# Patient Record
Sex: FEMALE | Race: BLACK OR AFRICAN AMERICAN
[De-identification: names, ages, dates, MRNs, and addresses within clinical notes are randomized per-mention and may not be internally consistent; named-entity substitution may affect disease eponyms.]

---

## 2023-07-06 ENCOUNTER — APPOINTMENT (OUTPATIENT)
Dept: OBGYN | Facility: CLINIC | Age: 63
End: 2023-07-06
Payer: MEDICARE

## 2023-07-06 VITALS
SYSTOLIC BLOOD PRESSURE: 150 MMHG | HEART RATE: 79 BPM | BODY MASS INDEX: 38.62 KG/M2 | OXYGEN SATURATION: 99 % | TEMPERATURE: 98.6 F | HEIGHT: 63 IN | DIASTOLIC BLOOD PRESSURE: 94 MMHG | WEIGHT: 218 LBS

## 2023-07-06 DIAGNOSIS — Z01.419 ENCOUNTER FOR GYNECOLOGICAL EXAMINATION (GENERAL) (ROUTINE) W/OUT ABNORMAL FINDINGS: ICD-10-CM

## 2023-07-06 DIAGNOSIS — N95.0 POSTMENOPAUSAL BLEEDING: ICD-10-CM

## 2023-07-06 PROBLEM — Z00.00 ENCOUNTER FOR PREVENTIVE HEALTH EXAMINATION: Status: ACTIVE | Noted: 2023-07-06

## 2023-07-06 LAB
BILIRUB UR QL STRIP: NEGATIVE
CLARITY UR: NORMAL
COLLECTION METHOD: NORMAL
GLUCOSE UR-MCNC: NORMAL
HCG UR QL: 0.2 EU/DL
HGB UR QL STRIP.AUTO: NORMAL
KETONES UR-MCNC: NEGATIVE
LEUKOCYTE ESTERASE UR QL STRIP: NEGATIVE
NITRITE UR QL STRIP: NEGATIVE
PH UR STRIP: 5.5
PROT UR STRIP-MCNC: NEGATIVE
SP GR UR STRIP: 1.02

## 2023-07-06 PROCEDURE — 81003 URINALYSIS AUTO W/O SCOPE: CPT | Mod: QW

## 2023-07-06 PROCEDURE — 99386 PREV VISIT NEW AGE 40-64: CPT

## 2023-07-06 NOTE — PHYSICAL EXAM
[Chaperone Present] : A chaperone was present in the examining room during all aspects of the physical examination [Appropriately responsive] : appropriately responsive [Alert] : alert [No Acute Distress] : no acute distress [Oriented x3] : oriented x3 [FreeTextEntry7] : obese  [Examination Of The Breasts] : a normal appearance [Breast Hypertrophy Bilateral] : hypertrophy [No Masses] : no breast masses were palpable [Labia Majora] : normal [Labia Minora] : normal [Scant] : There was scant vaginal bleeding [Normal] : normal [Uterine Adnexae] : normal

## 2023-07-06 NOTE — HISTORY OF PRESENT ILLNESS
[FreeTextEntry1] : NEW PATIENT PRESENT C/O POST MENOPAUSAL BLEED [TextBox_4] : pt presents for annual and c/o post menopausal bleeding/spotting \par pt with hx of DM/HTN \par HX- cholecystectomy then a partial intestine removal

## 2023-07-06 NOTE — PROCEDURE
[Cervical Pap Smear] : cervical Pap smear [Liquid Base] : liquid base [Tolerated Well] : the patient tolerated the procedure well [No Complications] : there were no complications [Endometrial Biopsy] : Endometrial biopsy [Post-Menop. Bleeding] : post-menopausal bleeding [Risks] : risks [Benefits] : benefits [Alternatives] : alternatives [de-identified] : HPV

## 2023-07-06 NOTE — PLAN
[FreeTextEntry1] : annual with pap/hpv \par EMB done \par TVS\par bone density \par metrogel vaginal cream 1 pv qhs x 5 \par diet/exercise\par f/u with PCP as needed \par pt to f/u results / rto 2- 3weeks/prn

## 2023-07-07 RX ORDER — METRONIDAZOLE 7.5 MG/G
0.75 GEL VAGINAL
Qty: 1 | Refills: 0 | Status: ACTIVE | COMMUNITY
Start: 2023-07-07 | End: 1900-01-01

## 2023-07-10 LAB
CYTOLOGY CVX/VAG DOC THIN PREP: NORMAL
HPV HIGH+LOW RISK DNA PNL CVX: NOT DETECTED

## 2023-07-11 LAB — CORE LAB BIOPSY: NORMAL

## 2023-08-01 ENCOUNTER — APPOINTMENT (OUTPATIENT)
Dept: CV DIAGNOSITCS | Facility: HOSPITAL | Age: 63
End: 2023-08-01

## 2023-08-15 ENCOUNTER — OUTPATIENT (OUTPATIENT)
Dept: OUTPATIENT SERVICES | Facility: HOSPITAL | Age: 63
LOS: 1 days | End: 2023-08-15
Payer: MEDICARE

## 2023-08-15 ENCOUNTER — RESULT REVIEW (OUTPATIENT)
Age: 63
End: 2023-08-15

## 2023-08-15 DIAGNOSIS — Z00.8 ENCOUNTER FOR OTHER GENERAL EXAMINATION: ICD-10-CM

## 2023-08-15 DIAGNOSIS — R07.9 CHEST PAIN, UNSPECIFIED: ICD-10-CM

## 2023-08-15 PROCEDURE — 93018 CV STRESS TEST I&R ONLY: CPT

## 2023-08-15 PROCEDURE — A9500: CPT

## 2023-08-15 PROCEDURE — 78452 HT MUSCLE IMAGE SPECT MULT: CPT | Mod: 26

## 2023-08-15 PROCEDURE — 78452 HT MUSCLE IMAGE SPECT MULT: CPT

## 2023-08-16 DIAGNOSIS — R07.9 CHEST PAIN, UNSPECIFIED: ICD-10-CM

## 2023-10-17 ENCOUNTER — OUTPATIENT (OUTPATIENT)
Dept: OUTPATIENT SERVICES | Facility: HOSPITAL | Age: 63
LOS: 1 days | End: 2023-10-17
Payer: MEDICARE

## 2023-10-17 VITALS
DIASTOLIC BLOOD PRESSURE: 63 MMHG | RESPIRATION RATE: 18 BRPM | HEIGHT: 63 IN | WEIGHT: 214.07 LBS | OXYGEN SATURATION: 97 % | HEART RATE: 98 BPM | TEMPERATURE: 98 F | SYSTOLIC BLOOD PRESSURE: 127 MMHG

## 2023-10-17 DIAGNOSIS — Z90.49 ACQUIRED ABSENCE OF OTHER SPECIFIED PARTS OF DIGESTIVE TRACT: Chronic | ICD-10-CM

## 2023-10-17 DIAGNOSIS — Z01.818 ENCOUNTER FOR OTHER PREPROCEDURAL EXAMINATION: ICD-10-CM

## 2023-10-17 DIAGNOSIS — N85.00 ENDOMETRIAL HYPERPLASIA, UNSPECIFIED: ICD-10-CM

## 2023-10-17 LAB
A1C WITH ESTIMATED AVERAGE GLUCOSE RESULT: 7.4 % — HIGH (ref 4–5.6)
A1C WITH ESTIMATED AVERAGE GLUCOSE RESULT: 7.4 % — HIGH (ref 4–5.6)
ALBUMIN SERPL ELPH-MCNC: 4.5 G/DL — SIGNIFICANT CHANGE UP (ref 3.5–5.2)
ALBUMIN SERPL ELPH-MCNC: 4.5 G/DL — SIGNIFICANT CHANGE UP (ref 3.5–5.2)
ALP SERPL-CCNC: 113 U/L — SIGNIFICANT CHANGE UP (ref 30–115)
ALP SERPL-CCNC: 113 U/L — SIGNIFICANT CHANGE UP (ref 30–115)
ALT FLD-CCNC: 13 U/L — SIGNIFICANT CHANGE UP (ref 0–41)
ALT FLD-CCNC: 13 U/L — SIGNIFICANT CHANGE UP (ref 0–41)
ANION GAP SERPL CALC-SCNC: 16 MMOL/L — HIGH (ref 7–14)
ANION GAP SERPL CALC-SCNC: 16 MMOL/L — HIGH (ref 7–14)
APTT BLD: 31.6 SEC — SIGNIFICANT CHANGE UP (ref 27–39.2)
APTT BLD: 31.6 SEC — SIGNIFICANT CHANGE UP (ref 27–39.2)
AST SERPL-CCNC: 12 U/L — SIGNIFICANT CHANGE UP (ref 0–41)
AST SERPL-CCNC: 12 U/L — SIGNIFICANT CHANGE UP (ref 0–41)
BASOPHILS # BLD AUTO: 0.05 K/UL — SIGNIFICANT CHANGE UP (ref 0–0.2)
BASOPHILS # BLD AUTO: 0.05 K/UL — SIGNIFICANT CHANGE UP (ref 0–0.2)
BASOPHILS NFR BLD AUTO: 0.4 % — SIGNIFICANT CHANGE UP (ref 0–1)
BASOPHILS NFR BLD AUTO: 0.4 % — SIGNIFICANT CHANGE UP (ref 0–1)
BILIRUB SERPL-MCNC: 0.3 MG/DL — SIGNIFICANT CHANGE UP (ref 0.2–1.2)
BILIRUB SERPL-MCNC: 0.3 MG/DL — SIGNIFICANT CHANGE UP (ref 0.2–1.2)
BUN SERPL-MCNC: 14 MG/DL — SIGNIFICANT CHANGE UP (ref 10–20)
BUN SERPL-MCNC: 14 MG/DL — SIGNIFICANT CHANGE UP (ref 10–20)
CALCIUM SERPL-MCNC: 9.4 MG/DL — SIGNIFICANT CHANGE UP (ref 8.4–10.5)
CALCIUM SERPL-MCNC: 9.4 MG/DL — SIGNIFICANT CHANGE UP (ref 8.4–10.5)
CHLORIDE SERPL-SCNC: 100 MMOL/L — SIGNIFICANT CHANGE UP (ref 98–110)
CHLORIDE SERPL-SCNC: 100 MMOL/L — SIGNIFICANT CHANGE UP (ref 98–110)
CO2 SERPL-SCNC: 23 MMOL/L — SIGNIFICANT CHANGE UP (ref 17–32)
CO2 SERPL-SCNC: 23 MMOL/L — SIGNIFICANT CHANGE UP (ref 17–32)
CREAT SERPL-MCNC: 0.7 MG/DL — SIGNIFICANT CHANGE UP (ref 0.7–1.5)
CREAT SERPL-MCNC: 0.7 MG/DL — SIGNIFICANT CHANGE UP (ref 0.7–1.5)
EGFR: 97 ML/MIN/1.73M2 — SIGNIFICANT CHANGE UP
EGFR: 97 ML/MIN/1.73M2 — SIGNIFICANT CHANGE UP
EOSINOPHIL # BLD AUTO: 0.23 K/UL — SIGNIFICANT CHANGE UP (ref 0–0.7)
EOSINOPHIL # BLD AUTO: 0.23 K/UL — SIGNIFICANT CHANGE UP (ref 0–0.7)
EOSINOPHIL NFR BLD AUTO: 1.9 % — SIGNIFICANT CHANGE UP (ref 0–8)
EOSINOPHIL NFR BLD AUTO: 1.9 % — SIGNIFICANT CHANGE UP (ref 0–8)
ESTIMATED AVERAGE GLUCOSE: 166 MG/DL — HIGH (ref 68–114)
ESTIMATED AVERAGE GLUCOSE: 166 MG/DL — HIGH (ref 68–114)
GLUCOSE SERPL-MCNC: 219 MG/DL — HIGH (ref 70–99)
GLUCOSE SERPL-MCNC: 219 MG/DL — HIGH (ref 70–99)
HCT VFR BLD CALC: 43.1 % — SIGNIFICANT CHANGE UP (ref 37–47)
HCT VFR BLD CALC: 43.1 % — SIGNIFICANT CHANGE UP (ref 37–47)
HGB BLD-MCNC: 13.3 G/DL — SIGNIFICANT CHANGE UP (ref 12–16)
HGB BLD-MCNC: 13.3 G/DL — SIGNIFICANT CHANGE UP (ref 12–16)
IMM GRANULOCYTES NFR BLD AUTO: 0.9 % — HIGH (ref 0.1–0.3)
IMM GRANULOCYTES NFR BLD AUTO: 0.9 % — HIGH (ref 0.1–0.3)
INR BLD: 0.97 RATIO — SIGNIFICANT CHANGE UP (ref 0.65–1.3)
INR BLD: 0.97 RATIO — SIGNIFICANT CHANGE UP (ref 0.65–1.3)
LYMPHOCYTES # BLD AUTO: 26.6 % — SIGNIFICANT CHANGE UP (ref 20.5–51.1)
LYMPHOCYTES # BLD AUTO: 26.6 % — SIGNIFICANT CHANGE UP (ref 20.5–51.1)
LYMPHOCYTES # BLD AUTO: 3.26 K/UL — SIGNIFICANT CHANGE UP (ref 1.2–3.4)
LYMPHOCYTES # BLD AUTO: 3.26 K/UL — SIGNIFICANT CHANGE UP (ref 1.2–3.4)
MCHC RBC-ENTMCNC: 25.9 PG — LOW (ref 27–31)
MCHC RBC-ENTMCNC: 25.9 PG — LOW (ref 27–31)
MCHC RBC-ENTMCNC: 30.9 G/DL — LOW (ref 32–37)
MCHC RBC-ENTMCNC: 30.9 G/DL — LOW (ref 32–37)
MCV RBC AUTO: 83.9 FL — SIGNIFICANT CHANGE UP (ref 81–99)
MCV RBC AUTO: 83.9 FL — SIGNIFICANT CHANGE UP (ref 81–99)
MONOCYTES # BLD AUTO: 0.58 K/UL — SIGNIFICANT CHANGE UP (ref 0.1–0.6)
MONOCYTES # BLD AUTO: 0.58 K/UL — SIGNIFICANT CHANGE UP (ref 0.1–0.6)
MONOCYTES NFR BLD AUTO: 4.7 % — SIGNIFICANT CHANGE UP (ref 1.7–9.3)
MONOCYTES NFR BLD AUTO: 4.7 % — SIGNIFICANT CHANGE UP (ref 1.7–9.3)
NEUTROPHILS # BLD AUTO: 8.03 K/UL — HIGH (ref 1.4–6.5)
NEUTROPHILS # BLD AUTO: 8.03 K/UL — HIGH (ref 1.4–6.5)
NEUTROPHILS NFR BLD AUTO: 65.5 % — SIGNIFICANT CHANGE UP (ref 42.2–75.2)
NEUTROPHILS NFR BLD AUTO: 65.5 % — SIGNIFICANT CHANGE UP (ref 42.2–75.2)
NRBC # BLD: 0 /100 WBCS — SIGNIFICANT CHANGE UP (ref 0–0)
NRBC # BLD: 0 /100 WBCS — SIGNIFICANT CHANGE UP (ref 0–0)
PLATELET # BLD AUTO: 259 K/UL — SIGNIFICANT CHANGE UP (ref 130–400)
PLATELET # BLD AUTO: 259 K/UL — SIGNIFICANT CHANGE UP (ref 130–400)
PMV BLD: 12.1 FL — HIGH (ref 7.4–10.4)
PMV BLD: 12.1 FL — HIGH (ref 7.4–10.4)
POTASSIUM SERPL-MCNC: 4.6 MMOL/L — SIGNIFICANT CHANGE UP (ref 3.5–5)
POTASSIUM SERPL-MCNC: 4.6 MMOL/L — SIGNIFICANT CHANGE UP (ref 3.5–5)
POTASSIUM SERPL-SCNC: 4.6 MMOL/L — SIGNIFICANT CHANGE UP (ref 3.5–5)
POTASSIUM SERPL-SCNC: 4.6 MMOL/L — SIGNIFICANT CHANGE UP (ref 3.5–5)
PROT SERPL-MCNC: 8.1 G/DL — HIGH (ref 6–8)
PROT SERPL-MCNC: 8.1 G/DL — HIGH (ref 6–8)
PROTHROM AB SERPL-ACNC: 11 SEC — SIGNIFICANT CHANGE UP (ref 9.95–12.87)
PROTHROM AB SERPL-ACNC: 11 SEC — SIGNIFICANT CHANGE UP (ref 9.95–12.87)
RBC # BLD: 5.14 M/UL — SIGNIFICANT CHANGE UP (ref 4.2–5.4)
RBC # BLD: 5.14 M/UL — SIGNIFICANT CHANGE UP (ref 4.2–5.4)
RBC # FLD: 16.3 % — HIGH (ref 11.5–14.5)
RBC # FLD: 16.3 % — HIGH (ref 11.5–14.5)
SODIUM SERPL-SCNC: 139 MMOL/L — SIGNIFICANT CHANGE UP (ref 135–146)
SODIUM SERPL-SCNC: 139 MMOL/L — SIGNIFICANT CHANGE UP (ref 135–146)
WBC # BLD: 12.26 K/UL — HIGH (ref 4.8–10.8)
WBC # BLD: 12.26 K/UL — HIGH (ref 4.8–10.8)
WBC # FLD AUTO: 12.26 K/UL — HIGH (ref 4.8–10.8)
WBC # FLD AUTO: 12.26 K/UL — HIGH (ref 4.8–10.8)

## 2023-10-17 PROCEDURE — 85730 THROMBOPLASTIN TIME PARTIAL: CPT

## 2023-10-17 PROCEDURE — 93005 ELECTROCARDIOGRAM TRACING: CPT

## 2023-10-17 PROCEDURE — 80053 COMPREHEN METABOLIC PANEL: CPT

## 2023-10-17 PROCEDURE — 83036 HEMOGLOBIN GLYCOSYLATED A1C: CPT

## 2023-10-17 PROCEDURE — 36415 COLL VENOUS BLD VENIPUNCTURE: CPT

## 2023-10-17 PROCEDURE — 99214 OFFICE O/P EST MOD 30 MIN: CPT | Mod: 25

## 2023-10-17 PROCEDURE — 85610 PROTHROMBIN TIME: CPT

## 2023-10-17 PROCEDURE — 85025 COMPLETE CBC W/AUTO DIFF WBC: CPT

## 2023-10-17 PROCEDURE — 93010 ELECTROCARDIOGRAM REPORT: CPT

## 2023-10-17 NOTE — H&P PST ADULT - HISTORY OF PRESENT ILLNESS
pt with post menopausal bleeding  now for planned procedure     PATIENT CURRENTLY DENIES CHEST PAIN  SHORTNESS OF BREATH  PALPITATIONS,  DYSURIA, OR UPPER RESPIRATORY INFECTION IN PAST 2 WEEKS  denies travel outside the [place] in the past 30 days  Patient denies any signs or symptoms of COVID 19 and denies contact with known positive individuals.  They have an appointment for repeat COVID testing pre-procedure and acknowledge its time and place.  They were instructed to quarantine pre-procedure, practice exposure control measures, continue to self-monitor and report any concerns to their proceduralist.  pt advised self quarantine till day of procedure    Anesthesia Alert  Difficult Airway class iv  NO--History of neck surger y or radiation  NO--Limited ROM of neck  NO--History of Malignant hyperthermia  NO--No personal or family history of Pseudocholinesterase deficiency.  NO--Prior Anesthesia Complication  NO--Latex Allergy  + Loose teeth- sl loose bottom font   NO--History of Rheumatoid Arthritis  NO--Bleeding risk  NO--GAGANDEEP  NO--Other_____    PT DENIES ANY RASHES, ABRASION, OR OPEN WOUNDS OR CUTS    AS PER THE PT, THIS IS HIS/HER COMPLETE MEDICAL AND SURGICAL HX, INCLUDING MEDICATIONS PRESCRIBED AND OVER THE COUNTER    Patient verbalized understanding of instructions and was given the opportunity to ask questions and have them answered.    pt denies any suicidal ideation or thoughts, pt states not a threat to self or others     Endometrial hyperplasia    Encounter for other preprocedural examination    [id number]    SysAdmin_VstLnk  Revised Cardiac Risk Index for Pre-Operative Risk from [employer]  on 10/17/2023    RESULT SUMMARY:  1 points  Class II Risk    6.0 %  30-day risk of death, MI, or cardiac arrest    From Ducescobar 2017, based on pooled data from 5 high quality external validations (4 prospective). These numbers are higher than those often quoted from the now-outdated original study (Roman 1999). See Evidence for details.      INPUTS:  Elevated-risk surgery —> 0 = No  History of ischemic heart disease —> 0 = No  History of congestive heart failure —> 0 = No  History of cerebrovascular disease —> 0 = No  Pre-operative treatment with insulin —> 1 = Yes  Pre-operative creatinine >2 mg/dL / 176.8 µmol/L —> 0 = No    Duke Activity Status Index (DASI) from [employer]  on 10/17/2023    RESULT SUMMARY:  25.95 points  The higher the score (maximum 58.2), the higher the functional status.    5.93 METs    INPUTS:  Take care of self —> 2.75 = Yes  Walk indoors —> 1.75 = Yes  Walk 1&ndash;2 blocks on level ground —> 2.75 = Yes  Climb a flight of stairs or walk up a hill —> 0 = No  Run a short distance —> 0 = No  Do light work around the house —> 2.7 = Yes  Do moderate work around the house —> 3.5 = Yes  Do heavy work around the house —> 8 = Yes  Do yardwork —> 4.5 = Yes  Have sexual relations —> 0 = No  Participate in moderate recreational activities —> 0 = No  Participate in strenuous sports —> 0 = No

## 2023-10-17 NOTE — H&P PST ADULT - REASON FOR ADMISSION
Patient is a  63 year old  female presenting to PAST in preparation for  DILATION AND CURETTAGE HYSTEROSCOPY MYOSURE on 10/26/23  under general anesthesia by bubba Corrigan

## 2023-10-17 NOTE — H&P PST ADULT - NSANTHOSAYNRD_GEN_A_CORE
No. GAGANDEEP screening performed.  STOP BANG Legend: 0-2 = LOW Risk; 3-4 = INTERMEDIATE Risk; 5-8 = HIGH Risk

## 2023-10-18 DIAGNOSIS — N85.00 ENDOMETRIAL HYPERPLASIA, UNSPECIFIED: ICD-10-CM

## 2023-10-18 DIAGNOSIS — Z01.818 ENCOUNTER FOR OTHER PREPROCEDURAL EXAMINATION: ICD-10-CM

## 2023-10-25 NOTE — ASU PATIENT PROFILE, ADULT - FALL HARM RISK - ATTEMPT OOB
[de-identified] : \par C/O: VERTIGO/ CLOGGED EARS\par -TYMPS: TYPE C AD, TYPE Ad AS (ETF ABNORMAL AU)\par -AD: MILD TO SEVERE MIXED -8000 HZ\par -AS: HEARING - 1000 HZ, MILD TO SEVERE SNHL THROUGH 8000 HZ\par RECS: 1) ENT F/U 2)RE-EVAL IN CONJUNCTION WITH MEDICAL MANAGEMENT 3)FURTHER TESTING AS PER MD 3)FURTHER RECS PENDING (I.E. AMPLIFICATION) No

## 2023-10-25 NOTE — ASU PATIENT PROFILE, ADULT - FALL HARM RISK - UNIVERSAL INTERVENTIONS
Bed in lowest position, wheels locked, appropriate side rails in place/Call bell, personal items and telephone in reach/Instruct patient to call for assistance before getting out of bed or chair/Non-slip footwear when patient is out of bed/[place] to call system/Physically safe environment - no spills, clutter or unnecessary equipment/Purposeful Proactive Rounding/Room/bathroom lighting operational, light cord in reach

## 2023-10-26 ENCOUNTER — TRANSCRIPTION ENCOUNTER (OUTPATIENT)
Age: 63
End: 2023-10-26

## 2023-10-26 ENCOUNTER — RESULT REVIEW (OUTPATIENT)
Age: 63
End: 2023-10-26

## 2023-10-26 ENCOUNTER — OUTPATIENT (OUTPATIENT)
Dept: OUTPATIENT SERVICES | Facility: HOSPITAL | Age: 63
LOS: 1 days | Discharge: HOME | End: 2023-10-26
Payer: MEDICARE

## 2023-10-26 VITALS
HEIGHT: 63 IN | RESPIRATION RATE: 18 BRPM | HEART RATE: 90 BPM | OXYGEN SATURATION: 98 % | TEMPERATURE: 99 F | SYSTOLIC BLOOD PRESSURE: 133 MMHG | DIASTOLIC BLOOD PRESSURE: 63 MMHG | WEIGHT: 214.07 LBS

## 2023-10-26 VITALS
HEART RATE: 92 BPM | OXYGEN SATURATION: 98 % | RESPIRATION RATE: 20 BRPM | DIASTOLIC BLOOD PRESSURE: 56 MMHG | SYSTOLIC BLOOD PRESSURE: 133 MMHG

## 2023-10-26 DIAGNOSIS — Z90.49 ACQUIRED ABSENCE OF OTHER SPECIFIED PARTS OF DIGESTIVE TRACT: Chronic | ICD-10-CM

## 2023-10-26 DIAGNOSIS — N85.00 ENDOMETRIAL HYPERPLASIA, UNSPECIFIED: ICD-10-CM

## 2023-10-26 LAB
GLUCOSE BLDC GLUCOMTR-MCNC: 188 MG/DL — HIGH (ref 70–99)
GLUCOSE BLDC GLUCOMTR-MCNC: 188 MG/DL — HIGH (ref 70–99)

## 2023-10-26 PROCEDURE — 58558 HYSTEROSCOPY BIOPSY: CPT

## 2023-10-26 PROCEDURE — 82962 GLUCOSE BLOOD TEST: CPT

## 2023-10-26 PROCEDURE — 88305 TISSUE EXAM BY PATHOLOGIST: CPT | Mod: 26

## 2023-10-26 PROCEDURE — 88305 TISSUE EXAM BY PATHOLOGIST: CPT

## 2023-10-26 RX ORDER — OXYCODONE HYDROCHLORIDE 5 MG/1
5 TABLET ORAL ONCE
Refills: 0 | Status: DISCONTINUED | OUTPATIENT
Start: 2023-10-26 | End: 2023-10-26

## 2023-10-26 RX ORDER — SODIUM CHLORIDE 9 MG/ML
1000 INJECTION, SOLUTION INTRAVENOUS
Refills: 0 | Status: DISCONTINUED | OUTPATIENT
Start: 2023-10-26 | End: 2023-10-26

## 2023-10-26 RX ORDER — HYDROMORPHONE HYDROCHLORIDE 2 MG/ML
1 INJECTION INTRAMUSCULAR; INTRAVENOUS; SUBCUTANEOUS
Refills: 0 | Status: DISCONTINUED | OUTPATIENT
Start: 2023-10-26 | End: 2023-10-26

## 2023-10-26 RX ORDER — ONDANSETRON 8 MG/1
4 TABLET, FILM COATED ORAL ONCE
Refills: 0 | Status: DISCONTINUED | OUTPATIENT
Start: 2023-10-26 | End: 2023-10-26

## 2023-10-26 RX ORDER — HYDROMORPHONE HYDROCHLORIDE 2 MG/ML
0.5 INJECTION INTRAMUSCULAR; INTRAVENOUS; SUBCUTANEOUS
Refills: 0 | Status: DISCONTINUED | OUTPATIENT
Start: 2023-10-26 | End: 2023-10-26

## 2023-10-26 NOTE — ASU DISCHARGE PLAN (ADULT/PEDIATRIC) - CARE PROVIDER_API CALL
Montez Randolph  Obstetrics and Gynecology  [address] [place] [id number]  Phone: [phone]  Fax: [phone]  Follow Up Time:

## 2023-10-26 NOTE — ASU PREOP CHECKLIST - DENTURES
Patient had a cataract surgery yesterday. She did have pain to the area. She had a post op appointment and put drops in her eyes. She had a pressure of 45 then dropped to 35. She was given drops. She is suppose to take the drops three times a day. She only put two doses in her eye.   
no

## 2023-10-26 NOTE — CHART NOTE - NSCHARTNOTEFT_GEN_A_CORE
PACU ANESTHESIA ADMISSION NOTE      Procedure: D&C Hysteroscopy, ECC  Post op diagnosis:      ____  Intubated  TV:______       Rate: ______      FiO2: ______    _x___  Patent Airway    _x___  Full return of protective reflexes    _x___  Full recovery from anesthesia / back to baseline status    Vitals:  T(C): 37 (10-26-23 @ 07:14), Max: 37 (10-26-23 @ 06:50)  HR: 90 (10-26-23 @ 07:14) (90 - 90)  BP: 133/63 (10-26-23 @ 07:14) (133/63 - 133/63)  RR: 18 (10-26-23 @ 07:14) (18 - 18)  SpO2: 98% (10-26-23 @ 07:14) (98% - 98%)    Mental Status:  _x___ Awake   _____ Alert   _____ Drowsy   _____ Sedated    Nausea/Vomiting:  _x___  NO       ______Yes,   See Post - Op Orders         Pain Scale (0-10):  __0___    Treatment: _x___ None    ____ See Post - Op/PCA Orders    Post - Operative Fluids:   __x__ Oral   ____ See Post - Op Orders    Plan: Discharge:   _x___Home       _____Floor     _____Critical Care    _____  Other:_________________    Comments:  No anesthesia issues or complications noted.  Discharge when criteria met. PACU ANESTHESIA ADMISSION NOTE      Procedure: D&C Hysteroscopy, ECC  Post op diagnosis:  postmenopausal bleeding      ____  Intubated  TV:______       Rate: ______      FiO2: ______    _x___  Patent Airway    _x___  Full return of protective reflexes    _x___  Full recovery from anesthesia / back to baseline status    Vitals:  T(C): 37 (10-26-23 @ 07:14), Max: 37 (10-26-23 @ 06:50)  HR: 90 (10-26-23 @ 07:14) (90 - 90)  BP: 133/63 (10-26-23 @ 07:14) (133/63 - 133/63)  RR: 18 (10-26-23 @ 07:14) (18 - 18)  SpO2: 98% (10-26-23 @ 07:14) (98% - 98%)    Mental Status:  _x___ Awake   _____ Alert   _____ Drowsy   _____ Sedated    Nausea/Vomiting:  _x___  NO       ______Yes,   See Post - Op Orders         Pain Scale (0-10):  __0___    Treatment: _x___ None    ____ See Post - Op/PCA Orders    Post - Operative Fluids:   __x__ Oral   ____ See Post - Op Orders    Plan: Discharge:   _x___Home       _____Floor     _____Critical Care    _____  Other:_________________    Comments:  No anesthesia issues or complications noted.  All teeth intact  Discharge when criteria met.

## 2023-10-26 NOTE — ASU DISCHARGE PLAN (ADULT/PEDIATRIC) - NS MD DC FALL RISK RISK
For information on Fall & Injury Prevention, visit: [email or web address] OR  [email or web address] OR  https://www.cdc.gov/steadi/patient.html

## 2023-10-26 NOTE — BRIEF OPERATIVE NOTE - OPERATION/FINDINGS
Normal external genitalia. Mobile, anteverted uterus, sounding to 9cm. Multiparous cervix. polypoid like tissue in the lower uterine segment on the posterior wall. Bilateral tubal ostia visualized.

## 2023-10-27 PROBLEM — F32.A DEPRESSION, UNSPECIFIED: Chronic | Status: ACTIVE | Noted: 2023-10-17

## 2023-10-27 PROBLEM — E11.9 TYPE 2 DIABETES MELLITUS WITHOUT COMPLICATIONS: Chronic | Status: ACTIVE | Noted: 2023-10-17

## 2023-10-27 PROBLEM — F41.9 ANXIETY DISORDER, UNSPECIFIED: Chronic | Status: ACTIVE | Noted: 2023-10-17

## 2023-10-27 PROBLEM — E66.9 OBESITY, UNSPECIFIED: Chronic | Status: ACTIVE | Noted: 2023-10-17

## 2023-10-28 LAB
SURGICAL PATHOLOGY STUDY: SIGNIFICANT CHANGE UP
SURGICAL PATHOLOGY STUDY: SIGNIFICANT CHANGE UP

## 2023-10-30 DIAGNOSIS — N84.1 POLYP OF CERVIX UTERI: ICD-10-CM

## 2023-10-30 DIAGNOSIS — N87.9 DYSPLASIA OF CERVIX UTERI, UNSPECIFIED: ICD-10-CM

## 2023-10-30 DIAGNOSIS — Z79.85 LONG-TERM (CURRENT) USE OF INJECTABLE NON-INSULIN ANTIDIABETIC DRUGS: ICD-10-CM

## 2023-10-30 DIAGNOSIS — N95.0 POSTMENOPAUSAL BLEEDING: ICD-10-CM

## 2023-10-30 DIAGNOSIS — Z79.84 LONG TERM (CURRENT) USE OF ORAL HYPOGLYCEMIC DRUGS: ICD-10-CM

## 2023-10-30 DIAGNOSIS — E11.9 TYPE 2 DIABETES MELLITUS WITHOUT COMPLICATIONS: ICD-10-CM

## 2023-10-30 DIAGNOSIS — F17.210 NICOTINE DEPENDENCE, CIGARETTES, UNCOMPLICATED: ICD-10-CM

## 2023-10-30 DIAGNOSIS — N71.1 CHRONIC INFLAMMATORY DISEASE OF UTERUS: ICD-10-CM

## 2023-10-30 DIAGNOSIS — E66.9 OBESITY, UNSPECIFIED: ICD-10-CM

## 2023-10-30 DIAGNOSIS — F41.8 OTHER SPECIFIED ANXIETY DISORDERS: ICD-10-CM

## 2023-11-13 ENCOUNTER — APPOINTMENT (OUTPATIENT)
Dept: OBGYN | Facility: CLINIC | Age: 63
End: 2023-11-13

## 2024-05-28 ENCOUNTER — OUTPATIENT (OUTPATIENT)
Dept: OUTPATIENT SERVICES | Facility: HOSPITAL | Age: 64
LOS: 1 days | Discharge: HOME | End: 2024-05-28
Payer: MEDICARE

## 2024-05-28 ENCOUNTER — TRANSCRIPTION ENCOUNTER (OUTPATIENT)
Age: 64
End: 2024-05-28

## 2024-05-28 VITALS
OXYGEN SATURATION: 99 % | RESPIRATION RATE: 18 BRPM | HEART RATE: 69 BPM | HEIGHT: 62 IN | DIASTOLIC BLOOD PRESSURE: 77 MMHG | TEMPERATURE: 98 F | SYSTOLIC BLOOD PRESSURE: 140 MMHG | WEIGHT: 209 LBS

## 2024-05-28 VITALS — RESPIRATION RATE: 15 BRPM | SYSTOLIC BLOOD PRESSURE: 153 MMHG | DIASTOLIC BLOOD PRESSURE: 76 MMHG | HEART RATE: 81 BPM

## 2024-05-28 DIAGNOSIS — Z90.49 ACQUIRED ABSENCE OF OTHER SPECIFIED PARTS OF DIGESTIVE TRACT: Chronic | ICD-10-CM

## 2024-05-28 DIAGNOSIS — H25.11 AGE-RELATED NUCLEAR CATARACT, RIGHT EYE: ICD-10-CM

## 2024-05-28 LAB — GLUCOSE BLDC GLUCOMTR-MCNC: 168 MG/DL — HIGH (ref 70–99)

## 2024-05-28 PROCEDURE — 82962 GLUCOSE BLOOD TEST: CPT

## 2024-05-28 PROCEDURE — V2632: CPT

## 2024-05-28 NOTE — ASU PATIENT PROFILE, ADULT - FALL HARM RISK - HARM RISK INTERVENTIONS

## 2024-05-28 NOTE — ASU PATIENT PROFILE, ADULT - NSICDXPASTMEDICALHX_GEN_ALL_CORE_FT
PAST MEDICAL HISTORY:  Anxiety     Depression     DM (diabetes mellitus)     History of medical problems hypercholesteremia, htn, cataracts, oa, cigarette smoker    Obesity

## 2024-06-03 DIAGNOSIS — E78.5 HYPERLIPIDEMIA, UNSPECIFIED: ICD-10-CM

## 2024-06-03 DIAGNOSIS — Z79.84 LONG TERM (CURRENT) USE OF ORAL HYPOGLYCEMIC DRUGS: ICD-10-CM

## 2024-06-03 DIAGNOSIS — H25.11 AGE-RELATED NUCLEAR CATARACT, RIGHT EYE: ICD-10-CM

## 2024-06-03 DIAGNOSIS — I10 ESSENTIAL (PRIMARY) HYPERTENSION: ICD-10-CM

## 2024-06-03 DIAGNOSIS — Z79.85 LONG-TERM (CURRENT) USE OF INJECTABLE NON-INSULIN ANTIDIABETIC DRUGS: ICD-10-CM

## 2024-06-03 DIAGNOSIS — F17.210 NICOTINE DEPENDENCE, CIGARETTES, UNCOMPLICATED: ICD-10-CM

## 2024-06-03 DIAGNOSIS — E11.36 TYPE 2 DIABETES MELLITUS WITH DIABETIC CATARACT: ICD-10-CM

## 2024-06-11 ENCOUNTER — TRANSCRIPTION ENCOUNTER (OUTPATIENT)
Age: 64
End: 2024-06-11

## 2024-06-11 ENCOUNTER — OUTPATIENT (OUTPATIENT)
Dept: OUTPATIENT SERVICES | Facility: HOSPITAL | Age: 64
LOS: 1 days | Discharge: HOME | End: 2024-06-11
Payer: MEDICARE

## 2024-06-11 VITALS — SYSTOLIC BLOOD PRESSURE: 127 MMHG | DIASTOLIC BLOOD PRESSURE: 73 MMHG | HEART RATE: 60 BPM | RESPIRATION RATE: 17 BRPM

## 2024-06-11 VITALS
HEIGHT: 63 IN | WEIGHT: 199.96 LBS | HEART RATE: 80 BPM | OXYGEN SATURATION: 97 % | TEMPERATURE: 98 F | RESPIRATION RATE: 17 BRPM | DIASTOLIC BLOOD PRESSURE: 78 MMHG | SYSTOLIC BLOOD PRESSURE: 137 MMHG

## 2024-06-11 DIAGNOSIS — H26.9 UNSPECIFIED CATARACT: Chronic | ICD-10-CM

## 2024-06-11 DIAGNOSIS — Z90.49 ACQUIRED ABSENCE OF OTHER SPECIFIED PARTS OF DIGESTIVE TRACT: Chronic | ICD-10-CM

## 2024-06-11 DIAGNOSIS — H25.12 AGE-RELATED NUCLEAR CATARACT, LEFT EYE: ICD-10-CM

## 2024-06-11 LAB — GLUCOSE BLDC GLUCOMTR-MCNC: 130 MG/DL — HIGH (ref 70–99)

## 2024-06-11 PROCEDURE — V2632: CPT

## 2024-06-11 PROCEDURE — 82962 GLUCOSE BLOOD TEST: CPT

## 2024-06-11 RX ORDER — METOPROLOL TARTRATE 50 MG
0 TABLET ORAL
Refills: 0 | DISCHARGE

## 2024-06-11 RX ORDER — ATORVASTATIN CALCIUM 80 MG/1
1 TABLET, FILM COATED ORAL
Refills: 0 | DISCHARGE

## 2024-06-11 RX ORDER — CITALOPRAM 10 MG/1
1 TABLET, FILM COATED ORAL
Refills: 0 | DISCHARGE

## 2024-06-11 RX ORDER — METFORMIN HYDROCHLORIDE 850 MG/1
1 TABLET ORAL
Refills: 0 | DISCHARGE

## 2024-06-11 RX ORDER — SERTRALINE 25 MG/1
1 TABLET, FILM COATED ORAL
Refills: 0 | DISCHARGE

## 2024-06-11 RX ORDER — EMPAGLIFLOZIN 10 MG/1
1 TABLET, FILM COATED ORAL
Refills: 0 | DISCHARGE

## 2024-06-11 RX ORDER — DULAGLUTIDE 4.5 MG/.5ML
0.75 INJECTION, SOLUTION SUBCUTANEOUS
Refills: 0 | DISCHARGE

## 2024-06-11 NOTE — ASU PATIENT PROFILE, ADULT - FALL HARM RISK - HARM RISK INTERVENTIONS

## 2024-06-11 NOTE — PRE-ANESTHESIA EVALUATION ADULT - NSANTHDIETYNSD_GEN_ALL_CORE
Pt arrives with c/o right ankle pain that started after landing on it wrong while playing basketball. Pt believes he sprained his ankle. Yes

## 2024-06-11 NOTE — ASU PATIENT PROFILE, ADULT - NSICDXPASTMEDICALHX_GEN_ALL_CORE_FT
PAST MEDICAL HISTORY:  Anxiety     Cataract     Depression     DM (diabetes mellitus)     High cholesterol     History of cigarette smoking     HTN (hypertension)     OA (osteoarthritis)     Obesity

## 2024-06-13 DIAGNOSIS — Z87.891 PERSONAL HISTORY OF NICOTINE DEPENDENCE: ICD-10-CM

## 2024-06-13 DIAGNOSIS — M19.90 UNSPECIFIED OSTEOARTHRITIS, UNSPECIFIED SITE: ICD-10-CM

## 2024-06-13 DIAGNOSIS — E66.9 OBESITY, UNSPECIFIED: ICD-10-CM

## 2024-06-13 DIAGNOSIS — Z79.84 LONG TERM (CURRENT) USE OF ORAL HYPOGLYCEMIC DRUGS: ICD-10-CM

## 2024-06-13 DIAGNOSIS — Z79.85 LONG-TERM (CURRENT) USE OF INJECTABLE NON-INSULIN ANTIDIABETIC DRUGS: ICD-10-CM

## 2024-06-13 DIAGNOSIS — E11.36 TYPE 2 DIABETES MELLITUS WITH DIABETIC CATARACT: ICD-10-CM

## 2024-06-13 DIAGNOSIS — H25.12 AGE-RELATED NUCLEAR CATARACT, LEFT EYE: ICD-10-CM

## 2024-06-13 DIAGNOSIS — H25.012 CORTICAL AGE-RELATED CATARACT, LEFT EYE: ICD-10-CM

## 2024-06-13 DIAGNOSIS — F41.8 OTHER SPECIFIED ANXIETY DISORDERS: ICD-10-CM

## 2024-06-13 DIAGNOSIS — E78.00 PURE HYPERCHOLESTEROLEMIA, UNSPECIFIED: ICD-10-CM

## 2024-06-13 DIAGNOSIS — I10 ESSENTIAL (PRIMARY) HYPERTENSION: ICD-10-CM

## 2024-10-11 DIAGNOSIS — Z01.812 ENCOUNTER FOR PREPROCEDURAL LABORATORY EXAMINATION: ICD-10-CM

## 2025-02-14 PROBLEM — E78.00 PURE HYPERCHOLESTEROLEMIA, UNSPECIFIED: Chronic | Status: ACTIVE | Noted: 2024-06-11

## 2025-02-14 PROBLEM — I10 ESSENTIAL (PRIMARY) HYPERTENSION: Chronic | Status: ACTIVE | Noted: 2024-06-11

## 2025-02-14 PROBLEM — Z87.891 PERSONAL HISTORY OF NICOTINE DEPENDENCE: Chronic | Status: ACTIVE | Noted: 2024-06-11

## 2025-02-14 PROBLEM — M19.90 UNSPECIFIED OSTEOARTHRITIS, UNSPECIFIED SITE: Chronic | Status: ACTIVE | Noted: 2024-06-11

## 2025-02-14 PROBLEM — H26.9 UNSPECIFIED CATARACT: Chronic | Status: ACTIVE | Noted: 2024-06-11

## 2025-02-25 ENCOUNTER — RESULT REVIEW (OUTPATIENT)
Age: 65
End: 2025-02-25

## 2025-02-25 ENCOUNTER — TRANSCRIPTION ENCOUNTER (OUTPATIENT)
Age: 65
End: 2025-02-25

## 2025-02-25 ENCOUNTER — OUTPATIENT (OUTPATIENT)
Dept: OUTPATIENT SERVICES | Facility: HOSPITAL | Age: 65
LOS: 1 days | Discharge: HOME | End: 2025-02-25
Payer: MEDICARE

## 2025-02-25 VITALS
RESPIRATION RATE: 18 BRPM | HEART RATE: 80 BPM | TEMPERATURE: 97 F | DIASTOLIC BLOOD PRESSURE: 64 MMHG | SYSTOLIC BLOOD PRESSURE: 141 MMHG | OXYGEN SATURATION: 97 %

## 2025-02-25 VITALS
SYSTOLIC BLOOD PRESSURE: 138 MMHG | RESPIRATION RATE: 17 BRPM | OXYGEN SATURATION: 98 % | DIASTOLIC BLOOD PRESSURE: 65 MMHG | TEMPERATURE: 97 F | HEART RATE: 90 BPM | WEIGHT: 194.01 LBS | HEIGHT: 63 IN

## 2025-02-25 DIAGNOSIS — Z90.49 ACQUIRED ABSENCE OF OTHER SPECIFIED PARTS OF DIGESTIVE TRACT: Chronic | ICD-10-CM

## 2025-02-25 DIAGNOSIS — D72.829 ELEVATED WHITE BLOOD CELL COUNT, UNSPECIFIED: ICD-10-CM

## 2025-02-25 DIAGNOSIS — H26.9 UNSPECIFIED CATARACT: Chronic | ICD-10-CM

## 2025-02-25 LAB — GLUCOSE BLDC GLUCOMTR-MCNC: 181 MG/DL — HIGH (ref 70–99)

## 2025-02-25 PROCEDURE — C1830: CPT

## 2025-02-25 PROCEDURE — 88237 TISSUE CULTURE BONE MARROW: CPT

## 2025-02-25 PROCEDURE — 88280 CHROMOSOME KARYOTYPE STUDY: CPT

## 2025-02-25 PROCEDURE — 88342 IMHCHEM/IMCYTCHM 1ST ANTB: CPT | Mod: 26

## 2025-02-25 PROCEDURE — 88311 DECALCIFY TISSUE: CPT | Mod: 26

## 2025-02-25 PROCEDURE — 88341 IMHCHEM/IMCYTCHM EA ADD ANTB: CPT | Mod: 26

## 2025-02-25 PROCEDURE — 88184 FLOWCYTOMETRY/ TC 1 MARKER: CPT

## 2025-02-25 PROCEDURE — 88341 IMHCHEM/IMCYTCHM EA ADD ANTB: CPT

## 2025-02-25 PROCEDURE — 88189 FLOWCYTOMETRY/READ 16 & >: CPT | Mod: 59

## 2025-02-25 PROCEDURE — 88313 SPECIAL STAINS GROUP 2: CPT

## 2025-02-25 PROCEDURE — 88275 CYTOGENETICS 100-300: CPT

## 2025-02-25 PROCEDURE — 88305 TISSUE EXAM BY PATHOLOGIST: CPT

## 2025-02-25 PROCEDURE — 88271 CYTOGENETICS DNA PROBE: CPT

## 2025-02-25 PROCEDURE — 88185 FLOWCYTOMETRY/TC ADD-ON: CPT

## 2025-02-25 PROCEDURE — 38222 DX BONE MARROW BX & ASPIR: CPT | Mod: RT

## 2025-02-25 PROCEDURE — 77012 CT SCAN FOR NEEDLE BIOPSY: CPT | Mod: 26

## 2025-02-25 PROCEDURE — 87205 SMEAR GRAM STAIN: CPT

## 2025-02-25 PROCEDURE — 88313 SPECIAL STAINS GROUP 2: CPT | Mod: 26

## 2025-02-25 PROCEDURE — 88291 CYTO/MOLECULAR REPORT: CPT

## 2025-02-25 PROCEDURE — 88264 CHROMOSOME ANALYSIS 20-25: CPT

## 2025-02-25 PROCEDURE — 88342 IMHCHEM/IMCYTCHM 1ST ANTB: CPT

## 2025-02-25 PROCEDURE — 77012 CT SCAN FOR NEEDLE BIOPSY: CPT

## 2025-02-25 PROCEDURE — 88311 DECALCIFY TISSUE: CPT

## 2025-02-25 PROCEDURE — 82962 GLUCOSE BLOOD TEST: CPT

## 2025-02-25 PROCEDURE — 99152 MOD SED SAME PHYS/QHP 5/>YRS: CPT

## 2025-02-25 PROCEDURE — 88305 TISSUE EXAM BY PATHOLOGIST: CPT | Mod: 26

## 2025-02-25 NOTE — ASU DISCHARGE PLAN (ADULT/PEDIATRIC) - FOR NEXT 24 HOURS DO NOT:
[facility] - Outpatient Follow-up Note  Karla Good 46 y o  female MRN: [id number]  Encounter: [id number]          ASSESSMENT AND PLAN:      1  External hemorrhoid  We discussed natural history and lack of good treatment options  Symptoms are improved and she was reassured that this will continue to improve and resolve with time  Discussed fiber supplementation, Sitz baths, topical treatments  Recommend avoiding soap and witch Hazel  discussed possible banding of internal hemorrhoids if this becomes a frequent problem    2  Irritable bowel syndrome with diarrhea  Symptoms somewhat improved on Eluxadoline but continues to have episodic diarrhea  We discussed consideration of a trial of Xifaxan in the future  Will follow-up in a few months    ______________________________________________________________________    SUBJECTIVE:  Patient with history of diarrhea predominant IBS on Eluxadoline recently traveled to [place] and had a flare of symptoms with significant diarrhea for number of days  Took some Imodium to try to control her symptoms  Shortly after this she developed painful bulging on the external anus with scant bleeding which has since receded and improved but not completely resolved  Her diarrhea continues to occur intermittently without any clear pattern    Colonoscopy in March 2020 with internal hemorrhoids      REVIEW OF SYSTEMS:    ROS       Historical Information   Past Medical History:   Diagnosis Date   • Colon polyp    • Hemorrhoids    • Irritable bowel syndrome      Past Surgical History:   Procedure Laterality Date   • APPENDECTOMY      Last assessed 11/16/2016    • COLONOSCOPY     • HYSTERECTOMY     • HYSTEROSCOPY W/ ENDOMETRIAL ABLATION      Last assessed 12/15/2014    • LAPAROTOMY N/A 02/08/2022    Procedure: LAPAROTOMY EXPLORATORY, EXTENSIVE SMALL BOWEL RESECTION;  Surgeon: Josh Newell MD;  Location: [facility];  Service: General   • OOPHORECTOMY • TUBAL LIGATION      Last assessed 12/15/2014      Social History   Social History     Substance and Sexual Activity   Alcohol Use Yes    Comment: occ     Social History     Substance and Sexual Activity   Drug Use Never     Social History     Tobacco Use   Smoking Status Former   • Types: Cigarettes   • Quit date:    • Years since quittin 9   Smokeless Tobacco Never     Family History   Problem Relation Age of Onset   • Breast cancer Mother 58   • Motor neuron disease Mother    • Cancer Father         Neck cancer   • Basal cell carcinoma Sister    • Breast cancer Paternal Grandmother 76   • Basal cell carcinoma Brother    • Colon cancer Maternal Aunt    • Breast cancer Maternal Aunt 80   • Beaufort's disease Family        Meds/Allergies       Current Outpatient Medications:   •  acetaminophen (TYLENOL) 325 mg tablet  •  aspirin 81 mg chewable tablet  •  Calcium Carbonate-Vit D-Min (CALCIUM 1200 PO)  •  Eluxadoline (Viberzi) 75 MG TABS  •  estradiol (Estrace) 1 mg tablet  •  famotidine (PEPCID) 40 MG tablet  •  loperamide (IMODIUM A-D) 2 MG tablet  •  ALPRAZolam (XANAX) 0 25 mg tablet    No Known Allergies        Objective     Blood pressure 152/99, pulse 79, height 5' 7" (1 702 m), weight 90 7 kg (200 lb)  Body mass index is 31 32 kg/m²  PHYSICAL EXAM:      Physical Exam     Lab Results:   No visits with results within 1 Day(s) from this visit  Latest known visit with results is:   Appointment on 2022   Component Date Value   • Mumps IgG 2022 Equivocal (A)          Radiology Results:   No results found  Statement Selected

## 2025-02-25 NOTE — ASU DISCHARGE PLAN (ADULT/PEDIATRIC) - FINANCIAL ASSISTANCE
[facility] provides services at a reduced cost to those who are determined to be eligible through [facility]’s financial assistance program. Information regarding [facility]’s financial assistance program can be found by going to [email or web address] or by calling [phone].

## 2025-02-25 NOTE — PROGRESS NOTE ADULT - SUBJECTIVE AND OBJECTIVE BOX
PROCEDURE: CT-guided bone marrow biopsy and aspiration    Procedural Personnel  Attending physician(s): Randy James  Fellow physician(s): None  Resident physician(s): None  Advanced practice provider(s): None    Pre-procedure diagnosis: Elevated WBC count  Post-procedure diagnosis: Same  Indication: Histopathologic diagnosis  Previous biopsy of same target (QCDR): No  Additional clinical history: None    Complications: No immediate complications.    IMPRESSION:    CT-guided bone marrow biopsy/aspiration.    Plan:     Specimen(s) sent for evaluation.  _______________________________________________________________    PROCEDURE SUMMARY:  - Percutaneous CT-guided coaxial core needle biopsy  - Additional procedure(s): bone marrow aspiration.    PROCEDURE DETAILS:    Pre-procedure  Consent: Informed consent for the procedure including risks, benefits and alternatives was obtained and time-out was performed prior to the procedure.  Preparation: The site was prepared and draped using maximal sterile barrier technique including cutaneous antisepsis.    Anesthesia/sedation  Level of anesthesia/sedation: Moderate sedation (conscious sedation)  Anesthesia/sedation administered by: Independent trained observer under attending supervision with continuous monitoring of the patient’s level of consciousness and physiologic status  Total intra-service sedation time (minutes): 20    Imaging prior to biopsy  The patient was positioned prone. Initial imaging was performed using noncontrast CT.  Biopsy target: Right posterior superior iliac spine.    Biopsy   Local anesthesia was administered. Under CT guidance, the biopsy needle was advanced to the target and biopsy was performed.  Coaxial needle: 11 gauge    Core needle biopsy device: ClearFit  Core needle size: 13 Gauge  Number of core specimens: 1    Additionally, an approximately 40 cc bone marrow aspiration sample was obtained as per protocol.     Needle removal  The biopsy needle was removed and a sterile dressing was applied.  Tract embolization: None    Imaging following biopsy  Immediate post-biopsy imaging was performed using noncontrast CT.  Post-biopsy imaging findings: Expected postbiopsy findings without evidence of hematoma.    Additional Details  Additional description of procedure: None  Equipment details: None  Specimens removed: Biopsy samples as detailed above  Estimated blood loss (mL): 40

## 2025-02-25 NOTE — PROGRESS NOTE ADULT - SUBJECTIVE AND OBJECTIVE BOX
Interventional Radiology Outpatient Documentation    PREOPERATIVE DAY OF PROCEDURE EVALUATION:     I have personally seen and examined this patient. I agree with the history and physical which I have reviewed and noted any changes below:     Plan is for image-guided bone marrow biopsy with sedation. (Signed electronically) 02-25-25 @ 08:50     Procedure/ risks/ benefits/ goals/ alternatives were explained. All questions answered. Informed content obtained from patient. Consent placed in chart.

## 2025-02-26 DIAGNOSIS — D72.829 ELEVATED WHITE BLOOD CELL COUNT, UNSPECIFIED: ICD-10-CM

## 2025-02-28 LAB — CHROM ANALY INTERPHASE BLD FISH-IMP: SIGNIFICANT CHANGE UP

## 2025-03-03 LAB
CHROM ANALY INTERPHASE BLD FISH-IMP: SIGNIFICANT CHANGE UP
HEMATOPATHOLOGY REPORT: SIGNIFICANT CHANGE UP
TM INTERPRETATION: SIGNIFICANT CHANGE UP

## 2025-03-10 LAB — CHROM ANALY OVERALL INTERP SPEC-IMP: SIGNIFICANT CHANGE UP
